# Patient Record
Sex: MALE | Race: WHITE | NOT HISPANIC OR LATINO
[De-identification: names, ages, dates, MRNs, and addresses within clinical notes are randomized per-mention and may not be internally consistent; named-entity substitution may affect disease eponyms.]

---

## 2018-02-20 ENCOUNTER — APPOINTMENT (OUTPATIENT)
Dept: VASCULAR SURGERY | Facility: CLINIC | Age: 83
End: 2018-02-20
Payer: MEDICARE

## 2018-02-20 PROCEDURE — 99213 OFFICE O/P EST LOW 20 MIN: CPT | Mod: 25

## 2018-02-20 PROCEDURE — 93880 EXTRACRANIAL BILAT STUDY: CPT

## 2019-05-23 ENCOUNTER — APPOINTMENT (OUTPATIENT)
Dept: VASCULAR SURGERY | Facility: CLINIC | Age: 84
End: 2019-05-23
Payer: MEDICARE

## 2019-05-23 VITALS — HEART RATE: 76 BPM | DIASTOLIC BLOOD PRESSURE: 67 MMHG | OXYGEN SATURATION: 98 % | SYSTOLIC BLOOD PRESSURE: 108 MMHG

## 2019-05-23 PROCEDURE — 99214 OFFICE O/P EST MOD 30 MIN: CPT

## 2019-05-23 PROCEDURE — 93880 EXTRACRANIAL BILAT STUDY: CPT

## 2019-05-24 NOTE — PROCEDURE
[FreeTextEntry1] : Carotid duplex \par - demonstrates widely patent R carotid stent\par - no change in L ICA stenosis

## 2019-05-24 NOTE — REVIEW OF SYSTEMS
[Negative] : Heme/Lymph [Leg Claudication] : no intermittent leg claudication [Lower Ext Edema] : no extremity edema [Confused] : no confusion [Dizziness] : no dizziness [Fainting] : no fainting [Limb Weakness] : no limb weakness [Difficulty Walking] : no difficulty walking

## 2019-05-24 NOTE — HISTORY OF PRESENT ILLNESS
[FreeTextEntry1] : 89 yo M w/h/o R ICA stenosis, s/p R carotid angio/stent, returning for a routine f/u. Last seen 2/2018. Patient denies any HA, vision changes, dysarthria, sudden focal deficits including weakness or sensory loss. He does report a recent fall while traveling in [place]. Patient was stepping onto a bus, tripped over the first step and hit his head on a metal bar. Patient denies LOC, sudden weakness of lower extremities, vision changes, before falling. He underwent head CT which was negative for hemorrhage. \par \par Other than the recent fall, the patient has no complaints and reports that he is doing well. Denies chest pain, shortness of breath, fevers, chills, rigors, or recent illnesses. \par \par

## 2019-05-24 NOTE — ASSESSMENT
[FreeTextEntry1] : 89 M w/ hx of R ICA stenosis, s/p R carotid stent placement, here for f/u. \par Remains asymptomatic w/ no neurologic deficits.\par Carotid duplex reveals widely patent R carotid stent, no change in L ICA stenosis.\par \par Plan: \par - continue Eliquis for afib\par - f/u in 1 year\par \par

## 2019-05-24 NOTE — PHYSICAL EXAM
[2+] : left 2+ [Alert] : alert [Oriented to Person] : oriented to person [Oriented to Place] : oriented to place [Oriented to Time] : oriented to time [JVD] : no jugular venous distention  [Carotid Bruits] : no carotid bruits [Ankle Swelling (On Exam)] : not present [] : not present [Skin Ulcer] : no ulcer [de-identified] : NAD, appears younger than age, pleasant [de-identified] : CNs grossly intact

## 2020-08-11 ENCOUNTER — APPOINTMENT (OUTPATIENT)
Dept: VASCULAR SURGERY | Facility: CLINIC | Age: 85
End: 2020-08-11

## 2021-02-09 ENCOUNTER — APPOINTMENT (OUTPATIENT)
Dept: VASCULAR SURGERY | Facility: CLINIC | Age: 86
End: 2021-02-09
Payer: MEDICARE

## 2021-02-09 PROCEDURE — 93880 EXTRACRANIAL BILAT STUDY: CPT

## 2021-02-09 PROCEDURE — 99213 OFFICE O/P EST LOW 20 MIN: CPT

## 2021-02-11 RX ORDER — FLUTICASONE PROPIONATE 50 UG/1
50 SPRAY, METERED NASAL
Qty: 48 | Refills: 0 | Status: ACTIVE | COMMUNITY
Start: 2020-12-01

## 2021-02-11 RX ORDER — OXYCODONE AND ACETAMINOPHEN 5; 325 MG/1; MG/1
5-325 TABLET ORAL
Qty: 15 | Refills: 0 | Status: COMPLETED | COMMUNITY
Start: 2020-08-20

## 2021-02-11 RX ORDER — APIXABAN 5 MG/1
5 TABLET, FILM COATED ORAL
Qty: 180 | Refills: 0 | Status: ACTIVE | COMMUNITY
Start: 2020-12-11

## 2021-02-11 RX ORDER — FUROSEMIDE 20 MG/1
20 TABLET ORAL
Qty: 90 | Refills: 0 | Status: ACTIVE | COMMUNITY
Start: 2020-11-29

## 2021-02-11 RX ORDER — METOPROLOL SUCCINATE 50 MG/1
50 TABLET, EXTENDED RELEASE ORAL
Qty: 90 | Refills: 0 | Status: COMPLETED | COMMUNITY
Start: 2020-12-20

## 2021-02-11 RX ORDER — SULFAMETHOXAZOLE AND TRIMETHOPRIM 800; 160 MG/1; MG/1
800-160 TABLET ORAL
Qty: 14 | Refills: 0 | Status: COMPLETED | COMMUNITY
Start: 2020-08-24

## 2021-02-15 NOTE — HISTORY OF PRESENT ILLNESS
[FreeTextEntry1] : 89yoM w/h/o R ICA stenosis, s/p R carotid angio/stent, returning for a routine f/u and surveillance.  Currently, pt denies any headaches, vision changes, dysarthria, sudden focal deficits including weakness or sensory loss.  He is compliant w/all meds, and has been active w/o limitation.  He recently came back to [place] after living in [place] w/his wife for the past year.

## 2021-02-15 NOTE — PHYSICAL EXAM
[Normal Thyroid] : the thyroid was normal [Normal Breath Sounds] : Normal breath sounds [Normal Rate and Rhythm] : normal rate and rhythm [2+] : left 2+ [No Rash or Lesion] : No rash or lesion [Alert] : alert [Oriented to Person] : oriented to person [Oriented to Place] : oriented to place [Oriented to Time] : oriented to time [JVD] : no jugular venous distention  [Carotid Bruits] : no carotid bruits [Right Carotid Bruit] : no bruit heard over the right carotid [Left Carotid Bruit] : no bruit heard over the left carotid [Ankle Swelling (On Exam)] : not present [] : not present [de-identified] : NAD, appears younger than age, pleasant [Skin Ulcer] : no ulcer [de-identified] : NC/AT, anicteric [de-identified] : FROM throughout, strength 5/5x4, neg Romberg/pronator drift [de-identified] : CNII-XII/ADELIA grossly intact

## 2021-02-15 NOTE — REVIEW OF SYSTEMS
[Negative] : Heme/Lymph [Leg Claudication] : no intermittent leg claudication [Lower Ext Edema] : no extremity edema [Confused] : no confusion [Dizziness] : no dizziness [Fainting] : no fainting [Difficulty Walking] : no difficulty walking [Limb Weakness] : no limb weakness

## 2021-02-15 NOTE — ASSESSMENT
[FreeTextEntry1] : 89yoM w/h/o R ICA stenosis, s/p R carotid angio/stent, returning for a routine f/u and surveillance.  Currently, pt denies any headaches, vision changes, dysarthria, sudden focal deficits including weakness or sensory loss.  He is compliant w/all meds, including eliquis for his afib, and has been active w/o limitation.  He recently came back to [place] after living in [place] w/his wife for the past year.\par \par No focal deficits on exam today, and current carotid duplex demonstrates widely patent R carotid stent, stable L ICA stenosis.  Pt will continue all meds and return in 1y for surveillance duplex.\par \par I personally discussed this patient with the Physician Assistant at the time of the visit. I agree with the assessment and plan as written

## 2021-06-02 PROCEDURE — 88305 TISSUE EXAM BY PATHOLOGIST: CPT | Performed by: CLINICAL MEDICAL LABORATORY

## 2021-06-03 ENCOUNTER — LAB REQUISITION (OUTPATIENT)
Dept: LAB | Age: 86
End: 2021-06-03

## 2021-06-03 DIAGNOSIS — D14.1 BENIGN NEOPLASM OF LARYNX: ICD-10-CM

## 2021-06-04 LAB
ASR DISCLAIMER: NORMAL
CASE RPRT: NORMAL
CLINICAL INFO: NORMAL
PATH REPORT.FINAL DX SPEC: NORMAL
PATH REPORT.GROSS SPEC: NORMAL

## 2021-10-05 ENCOUNTER — APPOINTMENT (OUTPATIENT)
Dept: VASCULAR SURGERY | Facility: CLINIC | Age: 86
End: 2021-10-05
Payer: MEDICARE

## 2021-10-05 VITALS
DIASTOLIC BLOOD PRESSURE: 72 MMHG | SYSTOLIC BLOOD PRESSURE: 113 MMHG | HEIGHT: 68 IN | WEIGHT: 140 LBS | HEART RATE: 87 BPM | BODY MASS INDEX: 21.22 KG/M2

## 2021-10-05 PROCEDURE — 93880 EXTRACRANIAL BILAT STUDY: CPT

## 2021-10-05 PROCEDURE — 99213 OFFICE O/P EST LOW 20 MIN: CPT

## 2021-10-07 NOTE — ASSESSMENT
[FreeTextEntry1] : 89yoM w/h/o R ICA stenosis, s/p R carotid angio/stent, returning for a routine f/u and surveillance.  Currently, pt denies any headaches, vision changes, dysarthria, sudden focal deficits including weakness or sensory loss.  He is compliant w/all meds, including eliquis for his afib, and has been active w/o limitation/new complaints.\par \par No focal deficits on exam today, and current carotid duplex demonstrates widely patent R carotid stent, stable L ICA stenosis.  Pt will continue all meds and return in 1y for surveillance duplex.\par \par I, Dr. Funes, personally performed the evaluation and management (E/M) services for this established patient who presents today with (a) new problem(s)/exacerbation of (an) existing condition(s).  That E/M includes conducting the examination, assessing all new/exacerbated conditions, and establishing a new plan of care.  Today, ACP, Nelson Alcantara, was here to observe my evaluation and management services for this new problem/exacerbated condition to be followed going forward.

## 2021-10-07 NOTE — PROCEDURE
[FreeTextEntry1] : Carotid duplex performed to evaluate stent for patency demonstrates widely patent R carotid stent, stable L ICA stenosis

## 2021-10-07 NOTE — HISTORY OF PRESENT ILLNESS
[FreeTextEntry1] : 89yoM w/h/o R ICA stenosis, s/p R carotid angio/stent, returning for a routine f/u and surveillance.  Currently, pt denies any headaches, vision changes, dysarthria, sudden focal deficits including weakness or sensory loss.  He is compliant w/all meds, and has been active w/o limitation/issue.

## 2021-10-07 NOTE — PHYSICAL EXAM
[Normal Thyroid] : the thyroid was normal [Normal Breath Sounds] : Normal breath sounds [Normal Rate and Rhythm] : normal rate and rhythm [2+] : left 2+ [No Rash or Lesion] : No rash or lesion [Alert] : alert [Oriented to Person] : oriented to person [Oriented to Place] : oriented to place [Oriented to Time] : oriented to time [JVD] : no jugular venous distention  [Carotid Bruits] : no carotid bruits [Right Carotid Bruit] : no bruit heard over the right carotid [Left Carotid Bruit] : no bruit heard over the left carotid [Ankle Swelling (On Exam)] : not present [] : not present [Skin Ulcer] : no ulcer [de-identified] : NAD, appears younger than age, pleasant [de-identified] : NC/AT, anicteric [de-identified] : FROM throughout, strength 5/5x4, neg Romberg/pronator drift [de-identified] : CNII-XII/ADELIA grossly intact

## 2021-10-07 NOTE — ADDENDUM
[FreeTextEntry1] : This note was written by Nelson Melara, acting as a scribe for Dr. Maria Eugenia Funes.  I, Dr. Maria Eugenia Funes, have read and attest that all the information, medical decision-making, and discharge instructions within are true and accurate.\par \par I, Dr. Maria Eugenia Funes, personally performed the evaluation and management (E/M) services for this established patient who presents today with (an) existing condition(s).  That E/M includes conducting the examination, assessing all conditions, and (re)establishing/reinforcing a plan of care.  Today, my ACP, Nelson Melara, was here to observe my evaluation and management services for this condition to be followed going forward.

## 2021-11-03 ENCOUNTER — APPOINTMENT (OUTPATIENT)
Dept: ORTHOPEDIC SURGERY | Facility: CLINIC | Age: 86
End: 2021-11-03
Payer: MEDICARE

## 2021-11-03 VITALS — RESPIRATION RATE: 16 BRPM | WEIGHT: 132 LBS | BODY MASS INDEX: 20 KG/M2 | HEIGHT: 68 IN

## 2021-11-03 DIAGNOSIS — R20.0 ANESTHESIA OF SKIN: ICD-10-CM

## 2021-11-03 PROCEDURE — 20526 THER INJECTION CARP TUNNEL: CPT | Mod: 50

## 2021-11-03 PROCEDURE — 73110 X-RAY EXAM OF WRIST: CPT | Mod: 50

## 2021-11-03 PROCEDURE — 99203 OFFICE O/P NEW LOW 30 MIN: CPT | Mod: 25

## 2021-11-03 RX ORDER — METOPROLOL SUCCINATE 25 MG/1
25 TABLET, EXTENDED RELEASE ORAL
Qty: 90 | Refills: 0 | Status: DISCONTINUED | COMMUNITY
Start: 2021-01-28 | End: 2021-11-03

## 2022-07-12 ENCOUNTER — APPOINTMENT (OUTPATIENT)
Dept: VASCULAR SURGERY | Facility: CLINIC | Age: 87
End: 2022-07-12

## 2022-07-12 PROCEDURE — 99213 OFFICE O/P EST LOW 20 MIN: CPT

## 2022-07-12 PROCEDURE — 93880 EXTRACRANIAL BILAT STUDY: CPT

## 2022-07-18 NOTE — PHYSICAL EXAM
[Normal Thyroid] : the thyroid was normal [Normal Breath Sounds] : Normal breath sounds [Normal Rate and Rhythm] : normal rate and rhythm [2+] : left 2+ [No Rash or Lesion] : No rash or lesion [Alert] : alert [Oriented to Person] : oriented to person [Oriented to Place] : oriented to place [Oriented to Time] : oriented to time [JVD] : no jugular venous distention  [Carotid Bruits] : no carotid bruits [Right Carotid Bruit] : no bruit heard over the right carotid [Left Carotid Bruit] : no bruit heard over the left carotid [Ankle Swelling (On Exam)] : not present [] : not present [Skin Ulcer] : no ulcer [de-identified] : NAD, appears younger than age, pleasant [de-identified] : NC/AT, anicteric [de-identified] : FROM throughout, strength 5/5x4, neg Romberg/pronator drift [de-identified] : CNII-XII/ADELIA grossly intact

## 2022-07-18 NOTE — PROCEDURE
[FreeTextEntry1] : Carotid duplex performed to evaluate stent for patency demonstrates widely patent R carotid stent, stable <50% L ICA stenosis

## 2022-10-13 ENCOUNTER — APPOINTMENT (OUTPATIENT)
Dept: VASCULAR SURGERY | Facility: CLINIC | Age: 87
End: 2022-10-13

## 2022-10-13 PROCEDURE — 93880 EXTRACRANIAL BILAT STUDY: CPT

## 2024-01-02 ENCOUNTER — APPOINTMENT (OUTPATIENT)
Dept: VASCULAR SURGERY | Facility: CLINIC | Age: 89
End: 2024-01-02
Payer: MEDICARE

## 2024-01-02 DIAGNOSIS — I65.29 OCCLUSION AND STENOSIS OF UNSPECIFIED CAROTID ARTERY: ICD-10-CM

## 2024-01-02 PROCEDURE — 93880 EXTRACRANIAL BILAT STUDY: CPT

## 2024-01-02 PROCEDURE — 99213 OFFICE O/P EST LOW 20 MIN: CPT

## 2024-01-02 NOTE — ASSESSMENT
[FreeTextEntry1] : 89yoM w/h/o R ICA stenosis, s/p R carotid angio/stent, returning for a routine f/u and surveillance.  Currently, pt denies any headaches, vision changes, dysarthria, sudden focal deficits including weakness or sensory loss.  He is compliant w/all meds, and has been active w/o limitation/issue.  Normal neuro exam noted today and carotid duplex performed to evaluate stent for patency demonstrates widely patent R carotid stent, stable <50% L ICA stenosis.  Recommend pt continue all meds and RTO in 1y for surveillance.

## 2024-01-02 NOTE — PHYSICAL EXAM
[JVD] : no jugular venous distention  [Normal Thyroid] : the thyroid was normal [Carotid Bruits] : no carotid bruits [Normal Breath Sounds] : Normal breath sounds [Normal Rate and Rhythm] : normal rate and rhythm [Right Carotid Bruit] : no bruit heard over the right carotid [Left Carotid Bruit] : no bruit heard over the left carotid [2+] : left 2+ [Ankle Swelling (On Exam)] : not present [] : not present [No Rash or Lesion] : No rash or lesion [Skin Ulcer] : no ulcer [Alert] : alert [Oriented to Person] : oriented to person [Oriented to Place] : oriented to place [Oriented to Time] : oriented to time [de-identified] : NAD, appears younger than age, pleasant [de-identified] : NC/AT, anicteric [de-identified] : FROM throughout, strength 5/5x4, neg Romberg/pronator drift [de-identified] : CNII-XII/ADELIA grossly intact

## 2024-01-02 NOTE — REVIEW OF SYSTEMS
[Leg Claudication] : no intermittent leg claudication [Lower Ext Edema] : no extremity edema [Confused] : no confusion [Dizziness] : no dizziness [Fainting] : no fainting [Limb Weakness] : no limb weakness [Difficulty Walking] : no difficulty walking [Negative] : Heme/Lymph

## 2024-01-02 NOTE — HISTORY OF PRESENT ILLNESS
[FreeTextEntry1] : 89yoM w/h/o R ICA stenosis, s/p R carotid angio/stent, returning for a routine f/u and surveillance.  Currently, pt denies any headaches, vision changes, dysarthria, sudden focal deficits including weakness or sensory loss.  He is compliant w/all meds, and has been active w/o limitation/issue.

## 2024-06-04 ENCOUNTER — APPOINTMENT (OUTPATIENT)
Dept: VASCULAR SURGERY | Facility: CLINIC | Age: 89
End: 2024-06-04
Payer: MEDICARE

## 2024-06-04 VITALS
DIASTOLIC BLOOD PRESSURE: 63 MMHG | HEIGHT: 68 IN | HEART RATE: 84 BPM | BODY MASS INDEX: 20 KG/M2 | WEIGHT: 132 LBS | SYSTOLIC BLOOD PRESSURE: 122 MMHG

## 2024-06-04 PROCEDURE — 93880 EXTRACRANIAL BILAT STUDY: CPT

## 2024-06-04 PROCEDURE — 99213 OFFICE O/P EST LOW 20 MIN: CPT

## 2024-06-05 NOTE — HISTORY OF PRESENT ILLNESS
[FreeTextEntry1] : 89yoM w/h/o R ICA stenosis, s/p R carotid angio/stent, returning for a routine f/u and surveillance.  Currently, pt denies any headaches, vision changes, dysarthria, sudden focal deficits including weakness or sensory loss.  He is compliant w/all meds, and has been active w/o limitation/issue.

## 2024-06-05 NOTE — PHYSICAL EXAM
[Normal Thyroid] : the thyroid was normal [Normal Breath Sounds] : Normal breath sounds [Normal Rate and Rhythm] : normal rate and rhythm [2+] : left 2+ [No Rash or Lesion] : No rash or lesion [Alert] : alert [Oriented to Person] : oriented to person [Oriented to Place] : oriented to place [Oriented to Time] : oriented to time [JVD] : no jugular venous distention  [Carotid Bruits] : no carotid bruits [Right Carotid Bruit] : no bruit heard over the right carotid [Left Carotid Bruit] : no bruit heard over the left carotid [Ankle Swelling (On Exam)] : not present [] : not present [Skin Ulcer] : no ulcer [de-identified] : NAD, appears younger than age, pleasant [de-identified] : NC/AT, anicteric [de-identified] : FROM throughout, strength 5/5x4, neg Romberg/pronator drift [de-identified] : CNII-XII/ADELIA grossly intact

## 2024-06-05 NOTE — PROCEDURE
[FreeTextEntry1] : Carotid duplex performed to evaluate stent for patency demonstrates widely patent R carotid stent, stable <50% L ICA stenosis, b/l vertebral arteries patent w/antegrade flow.

## 2025-05-06 ENCOUNTER — APPOINTMENT (OUTPATIENT)
Dept: VASCULAR SURGERY | Facility: CLINIC | Age: 89
End: 2025-05-06
Payer: MEDICARE

## 2025-05-06 DIAGNOSIS — I65.29 OCCLUSION AND STENOSIS OF UNSPECIFIED CAROTID ARTERY: ICD-10-CM

## 2025-05-06 PROCEDURE — 99213 OFFICE O/P EST LOW 20 MIN: CPT

## 2025-05-06 PROCEDURE — 93880 EXTRACRANIAL BILAT STUDY: CPT
